# Patient Record
Sex: FEMALE | Race: WHITE | Employment: FULL TIME | URBAN - METROPOLITAN AREA
[De-identification: names, ages, dates, MRNs, and addresses within clinical notes are randomized per-mention and may not be internally consistent; named-entity substitution may affect disease eponyms.]

---

## 2020-02-20 ENCOUNTER — OFFICE VISIT (OUTPATIENT)
Dept: DERMATOLOGY | Facility: CLINIC | Age: 30
End: 2020-02-20
Payer: COMMERCIAL

## 2020-02-20 VITALS — DIASTOLIC BLOOD PRESSURE: 80 MMHG | OXYGEN SATURATION: 99 % | HEART RATE: 70 BPM | SYSTOLIC BLOOD PRESSURE: 130 MMHG

## 2020-02-20 DIAGNOSIS — L70.0 ACNE VULGARIS: Primary | ICD-10-CM

## 2020-02-20 PROCEDURE — 99203 OFFICE O/P NEW LOW 30 MIN: CPT | Performed by: PHYSICIAN ASSISTANT

## 2020-02-20 RX ORDER — LEVALBUTEROL TARTRATE 45 UG/1
2 AEROSOL, METERED ORAL
COMMUNITY
Start: 2019-09-24 | End: 2020-09-23

## 2020-02-20 RX ORDER — ADAPALENE 0.1 G/100G
CREAM TOPICAL
Qty: 45 G | Refills: 5 | Status: SHIPPED | OUTPATIENT
Start: 2020-02-20

## 2020-02-20 RX ORDER — PREDNISONE 10 MG/1
TABLET ORAL
COMMUNITY
Start: 2019-11-22

## 2020-02-20 RX ORDER — ESOMEPRAZOLE MAGNESIUM 40 MG/1
CAPSULE, DELAYED RELEASE ORAL
COMMUNITY
Start: 2020-01-07

## 2020-02-20 RX ORDER — LEVALBUTEROL TARTRATE 45 UG/1
AEROSOL, METERED ORAL
COMMUNITY
Start: 2019-09-24

## 2020-02-20 RX ORDER — TACROLIMUS 1 MG/1
2 CAPSULE ORAL
COMMUNITY
Start: 2019-09-24 | End: 2020-12-01

## 2020-02-20 RX ORDER — CLINDAMYCIN PHOSPHATE 10 UG/ML
LOTION TOPICAL
COMMUNITY
Start: 2019-12-02

## 2020-02-20 RX ORDER — PENTAMIDINE ISETHIONATE 300 MG/300MG
300 INHALANT RESPIRATORY (INHALATION)
COMMUNITY
Start: 2019-09-25

## 2020-02-20 NOTE — PATIENT INSTRUCTIONS
Sulfacetamide wash use once daily or every other day.   Apply metrocream daily to twice daily.   Apply differin cream at bedtime, pea sized amount to treat entire face.   Recheck in 3 months.

## 2020-02-20 NOTE — NURSING NOTE
Initial /80 (BP Location: Right arm, Patient Position: Sitting, Cuff Size: Adult Large)   Pulse 70   SpO2 99%  There is no height or weight on file to calculate BMI. .

## 2020-02-20 NOTE — LETTER
2/20/2020         RE: Lucy Jessica  [address] [facility] [address]        Dear Colleague,    Thank you for referring your patient, Lucy Jessica, to the [facility]. Please see a copy of my visit note below.    Lucy Jessica is a 29 year old year old female patient here today for acne vulgaris. Patient reports that acne started this fall and has been worsening. She was diagnosed in June 2019 with autoimmune hepatitis. She was put on a high dosage of prednisone which has been lowered to 5 mg. She has tried topical clindamycin with little improvements. Her gastroenterologist prefers her to be on topicals. Patient has no other skin complaints today.  Remainder of the HPI, Meds, PMH, Allergies, FH, and SH was reviewed in chart.    Pertinent Hx:   Acne Vulgaris  History reviewed. No pertinent past medical history.    History reviewed. No pertinent surgical history.     Family History   Problem Relation Age of Onset     Melanoma No family hx of        Social History     Socioeconomic History     Marital status: Single     Spouse name: Not on file     Number of children: Not on file     Years of education: Not on file     Highest education level: Not on file   Occupational History     Not on file   Social Needs     Financial resource strain: Not on file     Food insecurity:     Worry: Not on file     Inability: Not on file     Transportation needs:     Medical: Not on file     Non-medical: Not on file   Tobacco Use     Smoking status: Never Smoker     Smokeless tobacco: Never Used   Substance and Sexual Activity     Alcohol use: Not on file     Drug use: Not on file     Sexual activity: Not on file   Lifestyle     Physical activity:     Days per week: Not on file     Minutes per session: Not on file     Stress: Not on file   Relationships     Social connections:     Talks on phone: Not on file     Gets together: Not on file     Attends Jain service: Not on file     Active member of club or  organization: Not on file     Attends meetings of clubs or organizations: Not on file     Relationship status: Not on file     Intimate partner violence:     Fear of current or ex partner: Not on file     Emotionally abused: Not on file     Physically abused: Not on file     Forced sexual activity: Not on file   Other Topics Concern     Not on file   Social History Narrative     Not on file       Outpatient Encounter Medications as of 2/20/2020   Medication Sig Dispense Refill     adapalene (DIFFERIN) 0.1 % EX external cream Apply pea sized amount at bedtime. 45 g 5     clindamycin 1 % EX external lotion        esomeprazole 40 MG PO DR capsule        levalbuterol 45 MCG/ACT IN inhaler INHALE 2 PUFFS BY MOUTH 4 TIMES DAILY PRIOR TO PENTAMIDINE (TO BE DONE ONLY AT THE DIRECTION OF RESPIRATORY THERAPIST).       levalbuterol 45 MCG/ACT IN inhaler Inhale 2 puffs into the lungs       metroNIDAZOLE 0.75 % EX external cream Apply twice daily to face. 45 g 5     pentamidine 300 MG IN neb solution Inhale 300 mg into the lungs       predniSONE 10 MG PO tablet        sulfacetamide sodium-sulfur 10-5 % EX EMUL Use to wash face daily. 340 g 4     tacrolimus 1 MG PO capsule Take 2 mg by mouth       No facility-administered encounter medications on file as of 2/20/2020.              Review Of Systems  Skin: As above  Eyes: negative  Ears/Nose/Throat: negative  Respiratory: No shortness of breath, dyspnea on exertion, cough, or hemoptysis  Cardiovascular: negative  Gastrointestinal: negative  Genitourinary: negative  Musculoskeletal: negative  Neurologic: negative  Psychiatric: negative  Hematologic/Lymphatic/Immunologic: negative  Endocrine: negative      O:   NAD, WDWN, Alert & Oriented, Mood & Affect wnl, Vitals stable   Here today alone   /80 (BP Location: Right arm, Patient Position: Sitting, Cuff Size: Adult Large)   Pulse 70   SpO2 99%    General appearance normal   Vitals stable   Alert, oriented and in no acute  distress     2+ inflammatory papules, comedones on lower half of face     Eyes: Conjunctivae/lids:Normal     ENT: Lips: normal    MSK:Normal    Cardiovascular: peripheral edema none    Pulm: Breathing Normal    Neuro/Psych: Orientation:Alert and Orientedx3 ; Mood/Affect:normal   A/P:  1. Acne Vulgaris   Use sulfacetamide cleanser.   Apply metrocream twice daily.   Apply differin at bedtime.   Skin care regimen reviewed with patient: Eliminate harsh soaps, i.e. Dial, zest, irsih spring; Mild soaps such as Cetaphil or Dove sensitive skin, avoid hot or cold showers, aggressive use of emollients including vanicream, cetaphil or cerave discussed with patient.    Use sunscreen daily.  Return to clinic in 3 months.       Again, thank you for allowing me to participate in the care of your patient.        Sincerely,        Dalia Sanches PA-C

## 2020-02-24 NOTE — PROGRESS NOTES
Lucy Jessica is a 29 year old year old female patient here today for acne vulgaris. Patient reports that acne started this fall and has been worsening. She was diagnosed in June 2019 with autoimmune hepatitis. She was put on a high dosage of prednisone which has been lowered to 5 mg. She has tried topical clindamycin with little improvements. Her gastroenterologist prefers her to be on topicals. Patient has no other skin complaints today.  Remainder of the HPI, Meds, PMH, Allergies, FH, and SH was reviewed in chart.    Pertinent Hx:   Acne Vulgaris  History reviewed. No pertinent past medical history.    History reviewed. No pertinent surgical history.     Family History   Problem Relation Age of Onset     Melanoma No family hx of        Social History     Socioeconomic History     Marital status: Single     Spouse name: Not on file     Number of children: Not on file     Years of education: Not on file     Highest education level: Not on file   Occupational History     Not on file   Social Needs     Financial resource strain: Not on file     Food insecurity:     Worry: Not on file     Inability: Not on file     Transportation needs:     Medical: Not on file     Non-medical: Not on file   Tobacco Use     Smoking status: Never Smoker     Smokeless tobacco: Never Used   Substance and Sexual Activity     Alcohol use: Not on file     Drug use: Not on file     Sexual activity: Not on file   Lifestyle     Physical activity:     Days per week: Not on file     Minutes per session: Not on file     Stress: Not on file   Relationships     Social connections:     Talks on phone: Not on file     Gets together: Not on file     Attends Yazdanism service: Not on file     Active member of club or organization: Not on file     Attends meetings of clubs or organizations: Not on file     Relationship status: Not on file     Intimate partner violence:     Fear of current or ex partner: Not on file     Emotionally abused: Not on file      Physically abused: Not on file     Forced sexual activity: Not on file   Other Topics Concern     Not on file   Social History Narrative     Not on file       Outpatient Encounter Medications as of 2/20/2020   Medication Sig Dispense Refill     adapalene (DIFFERIN) 0.1 % EX external cream Apply pea sized amount at bedtime. 45 g 5     clindamycin 1 % EX external lotion        esomeprazole 40 MG PO DR capsule        levalbuterol 45 MCG/ACT IN inhaler INHALE 2 PUFFS BY MOUTH 4 TIMES DAILY PRIOR TO PENTAMIDINE (TO BE DONE ONLY AT THE DIRECTION OF RESPIRATORY THERAPIST).       levalbuterol 45 MCG/ACT IN inhaler Inhale 2 puffs into the lungs       metroNIDAZOLE 0.75 % EX external cream Apply twice daily to face. 45 g 5     pentamidine 300 MG IN neb solution Inhale 300 mg into the lungs       predniSONE 10 MG PO tablet        sulfacetamide sodium-sulfur 10-5 % EX EMUL Use to wash face daily. 340 g 4     tacrolimus 1 MG PO capsule Take 2 mg by mouth       No facility-administered encounter medications on file as of 2/20/2020.              Review Of Systems  Skin: As above  Eyes: negative  Ears/Nose/Throat: negative  Respiratory: No shortness of breath, dyspnea on exertion, cough, or hemoptysis  Cardiovascular: negative  Gastrointestinal: negative  Genitourinary: negative  Musculoskeletal: negative  Neurologic: negative  Psychiatric: negative  Hematologic/Lymphatic/Immunologic: negative  Endocrine: negative      O:   NAD, WDWN, Alert & Oriented, Mood & Affect wnl, Vitals stable   Here today alone   /80 (BP Location: Right arm, Patient Position: Sitting, Cuff Size: Adult Large)   Pulse 70   SpO2 99%    General appearance normal   Vitals stable   Alert, oriented and in no acute distress     2+ inflammatory papules, comedones on lower half of face     Eyes: Conjunctivae/lids:Normal     ENT: Lips: normal    MSK:Normal    Cardiovascular: peripheral edema none    Pulm: Breathing Normal    Neuro/Psych: Orientation:Alert and  Orientedx3 ; Mood/Affect:normal   A/P:  1. Acne Vulgaris   Use sulfacetamide cleanser.   Apply metrocream twice daily.   Apply differin at bedtime.   Skin care regimen reviewed with patient: Eliminate harsh soaps, i.e. Dial, zest, irsih spring; Mild soaps such as Cetaphil or Dove sensitive skin, avoid hot or cold showers, aggressive use of emollients including vanicream, cetaphil or cerave discussed with patient.    Use sunscreen daily.  Return to clinic in 3 months.

## 2020-03-05 ENCOUNTER — TELEPHONE (OUTPATIENT)
Dept: DERMATOLOGY | Facility: CLINIC | Age: 30
End: 2020-03-05

## 2020-03-05 DIAGNOSIS — L65.9 LOSS OF HAIR: Primary | ICD-10-CM

## 2020-03-05 NOTE — TELEPHONE ENCOUNTER
Spoke to Isabel at [facility] lab at [facility].     I do not see any reason patient needed lab work done per chart review. I tried to reach patient, but no answer.     Does Patient need labs for any reason?...    Please advise. Jammie Arellano RN

## 2020-03-05 NOTE — TELEPHONE ENCOUNTER
I believe she also mentioned hair loss in the visit right at the end. I thought it may be due to autoimmune hepaitis. I can put future orders in and you can print.

## 2020-03-05 NOTE — TELEPHONE ENCOUNTER
Lab orders faxed to [place] lab at [facility] as requested. Jammie Arellano RN

## 2020-03-05 NOTE — TELEPHONE ENCOUNTER
Reason for Call: Request for an order or referral:    Order or referral being requested: labs    Date needed: as soon as possible    Has the patient been seen by the PCP for this problem? Not Applicable    Additional comments: eliot calling from [facility] needing lab orders for pt. Pt was there this morning. Please fax to [phone]    Phone number Patient can be reached at:  Other phone number:  [phone]*    Best Time:  Any     Can we leave a detailed message on this number?  YES    Call taken on 3/5/2020 at 8:47 AM by Delphine Olvera

## 2020-03-09 ENCOUNTER — TELEPHONE (OUTPATIENT)
Dept: DERMATOLOGY | Facility: CLINIC | Age: 30
End: 2020-03-09

## 2020-03-09 NOTE — TELEPHONE ENCOUNTER
Was this result from an outside lab? I only see future orders for Ferritin level in chart?... Thank you, Jammie Arellano RN

## 2020-03-09 NOTE — TELEPHONE ENCOUNTER
Elver Ayala,  Your ferritin is on the low side of normal for hair loss.   Eat a balanced diet that includes plenty of iron. Sources include red meat, spinach, broccoli, prune juice, kidney beans and chickpeas.   Add Vitamin C-rich foodsto your diet to help boost absorption of iron: Vitamin C sources are citrus fruits and juices, berries, green peppers, tomatoes, broccoli and spinach.     Also your Vit D level is on the low side, are you taking a vitamin D supplement? If not I would start 1000 international unit(s) daily.   Your other labs are normal.

## 2020-03-09 NOTE — LETTER
[facility]  [address] [facility] [address]  Phone: [phone]    March 9, 2020    Lucy Jessica                                                                                                                   [address] [facility] [address]            Dear MsDenton Jaskaran,    Your ferritin level is on the low side of normal for hair loss.   Eat a balanced diet that includes plenty of iron. Sources include red meat, spinach, broccoli, prune juice, kidney beans and chickpeas.   Add Vitamin C-rich foodsto your diet to help boost absorption of iron: Vitamin C sources are citrus fruits and juices, berries, green peppers, tomatoes, broccoli and spinach.      Also your Vit D level is on the low side, are you taking a vitamin D supplement? If not I would start 1000 international unit(s) daily.   Your other labs are normal.     Thank you,      Dalia RAINES / mmc